# Patient Record
Sex: MALE | Race: OTHER | NOT HISPANIC OR LATINO | Employment: OTHER | URBAN - NONMETROPOLITAN AREA
[De-identification: names, ages, dates, MRNs, and addresses within clinical notes are randomized per-mention and may not be internally consistent; named-entity substitution may affect disease eponyms.]

---

## 2023-05-13 ENCOUNTER — APPOINTMENT (OUTPATIENT)
Dept: CT IMAGING | Facility: HOSPITAL | Age: 68
End: 2023-05-13
Payer: COMMERCIAL

## 2023-05-13 ENCOUNTER — HOSPITAL ENCOUNTER (EMERGENCY)
Facility: HOSPITAL | Age: 68
Discharge: HOME | End: 2023-05-13
Attending: EMERGENCY MEDICINE
Payer: COMMERCIAL

## 2023-05-13 VITALS
TEMPERATURE: 98.2 F | WEIGHT: 315 LBS | HEART RATE: 76 BPM | HEIGHT: 73 IN | BODY MASS INDEX: 41.75 KG/M2 | SYSTOLIC BLOOD PRESSURE: 137 MMHG | DIASTOLIC BLOOD PRESSURE: 77 MMHG | RESPIRATION RATE: 18 BRPM | OXYGEN SATURATION: 97 %

## 2023-05-13 DIAGNOSIS — S13.9XXA NECK SPRAIN, INITIAL ENCOUNTER: ICD-10-CM
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
## 2023-05-13 LAB
ALBUMIN SERPL-MCNC: 3.9 G/DL (ref 3.5–5.2)
ALBUMIN/GLOB SERPL: 1.4 G/DL
ALP SERPL-CCNC: 48 U/L (ref 39–117)
ALT SERPL W P-5'-P-CCNC: 33 U/L (ref 1–41)
ANION GAP SERPL CALCULATED.3IONS-SCNC: 12.8 MMOL/L (ref 5–15)
AST SERPL-CCNC: 34 U/L (ref 1–40)
BASOPHILS # BLD AUTO: 0.09 10*3/MM3 (ref 0–0.2)
BASOPHILS NFR BLD AUTO: 0.8 % (ref 0–1.5)
BILIRUB SERPL-MCNC: 0.4 MG/DL (ref 0–1.2)
BUN SERPL-MCNC: 19 MG/DL (ref 8–23)
BUN/CREAT SERPL: 19.4 (ref 7–25)
CALCIUM SPEC-SCNC: 9.9 MG/DL (ref 8.6–10.5)
CHLORIDE SERPL-SCNC: 99 MMOL/L (ref 98–107)
CO2 SERPL-SCNC: 22.2 MMOL/L (ref 22–29)
CREAT SERPL-MCNC: 0.98 MG/DL (ref 0.76–1.27)
DEPRECATED RDW RBC AUTO: 44 FL (ref 37–54)
EGFRCR SERPLBLD CKD-EPI 2021: 84.5 ML/MIN/1.73
EOSINOPHIL # BLD AUTO: 0.15 10*3/MM3 (ref 0–0.4)
EOSINOPHIL NFR BLD AUTO: 1.4 % (ref 0.3–6.2)
ERYTHROCYTE [DISTWIDTH] IN BLOOD BY AUTOMATED COUNT: 13.3 % (ref 12.3–15.4)
GLOBULIN UR ELPH-MCNC: 2.8 GM/DL
GLUCOSE SERPL-MCNC: 128 MG/DL (ref 65–99)
HCT VFR BLD AUTO: 41.4 % (ref 37.5–51)
HGB BLD-MCNC: 14.2 G/DL (ref 13–17.7)
IMM GRANULOCYTES # BLD AUTO: 0.09 10*3/MM3 (ref 0–0.05)
IMM GRANULOCYTES NFR BLD AUTO: 0.8 % (ref 0–0.5)
LYMPHOCYTES # BLD AUTO: 2.22 10*3/MM3 (ref 0.7–3.1)
LYMPHOCYTES NFR BLD AUTO: 20 % (ref 19.6–45.3)
MCH RBC QN AUTO: 31 PG (ref 26.6–33)
MCHC RBC AUTO-ENTMCNC: 34.3 G/DL (ref 31.5–35.7)
MCV RBC AUTO: 90.4 FL (ref 79–97)
MONOCYTES # BLD AUTO: 1.24 10*3/MM3 (ref 0.1–0.9)
MONOCYTES NFR BLD AUTO: 11.2 % (ref 5–12)
NEUTROPHILS NFR BLD AUTO: 65.8 % (ref 42.7–76)
NEUTROPHILS NFR BLD AUTO: 7.29 10*3/MM3 (ref 1.7–7)
NRBC BLD AUTO-RTO: 0 /100 WBC (ref 0–0.2)
PLATELET # BLD AUTO: 166 10*3/MM3 (ref 140–450)
PMV BLD AUTO: 10.5 FL (ref 6–12)
POTASSIUM SERPL-SCNC: 4.3 MMOL/L (ref 3.5–5.2)
PROT SERPL-MCNC: 6.7 G/DL (ref 6–8.5)
RBC # BLD AUTO: 4.58 10*6/MM3 (ref 4.14–5.8)
SODIUM SERPL-SCNC: 134 MMOL/L (ref 136–145)
WBC NRBC COR # BLD: 11.08 10*3/MM3 (ref 3.4–10.8)

## 2023-05-13 PROCEDURE — 72125 CT NECK SPINE W/O DYE: CPT

## 2023-05-13 PROCEDURE — 96360 HYDRATION IV INFUSION INIT: CPT

## 2023-05-13 PROCEDURE — 80053 COMPREHEN METABOLIC PANEL: CPT | Performed by: PHYSICIAN ASSISTANT

## 2023-05-13 PROCEDURE — 99283 EMERGENCY DEPT VISIT LOW MDM: CPT

## 2023-05-13 PROCEDURE — 70450 CT HEAD/BRAIN W/O DYE: CPT

## 2023-05-13 PROCEDURE — 85025 COMPLETE CBC W/AUTO DIFF WBC: CPT | Performed by: PHYSICIAN ASSISTANT

## 2023-05-13 RX ORDER — SIMVASTATIN 20 MG
20 TABLET ORAL NIGHTLY
COMMUNITY

## 2023-05-13 RX ORDER — ALLOPURINOL 100 MG/1
100 TABLET ORAL DAILY
COMMUNITY

## 2023-05-13 RX ORDER — CYCLOBENZAPRINE HCL 10 MG
10 TABLET ORAL 3 TIMES DAILY PRN
Qty: 12 TABLET | Refills: 0 | Status: SHIPPED | OUTPATIENT
Start: 2023-05-13

## 2023-05-13 RX ORDER — ESCITALOPRAM OXALATE 10 MG/1
10 TABLET ORAL DAILY
COMMUNITY

## 2023-05-13 RX ORDER — LISINOPRIL 40 MG/1
40 TABLET ORAL DAILY
COMMUNITY

## 2023-05-13 RX ADMIN — SODIUM CHLORIDE 1000 ML: 9 INJECTION, SOLUTION INTRAVENOUS at 14:02

## 2023-05-13 NOTE — ED PROVIDER NOTES
"Subjective  History of Present Illness:    Chief Complaint: Fall, head and neck injury  History of Present Illness: 68-year-old male presents after a fall at home, he fell 2 nights ago, he states that he got up, felt nauseous, felt like he was going to vomit, and passed out hitting the ground, he hit the left side of scalp, once he hit his head on the ground, he came back to, he states he has had this before when he is felt like he was going to vomit and this passed out, he initially did not want to be seen in the emergency department but after 2 days he is having a headache and neck pain especially with turning his neck.  Onset: Sudden onset  Duration: 2 days ago  Exacerbating / Alleviating factors: Fall after vomiting  Associated symptoms: Headache neck pain        Nurses Notes reviewed and agree, including vitals, allergies, social history and prior medical history.     REVIEW OF SYSTEMS: All systems reviewed and not pertinent unless noted.    Review of Systems   Musculoskeletal: Positive for neck pain.   Neurological: Positive for headaches.   All other systems reviewed and are negative.      Past Medical History:   Diagnosis Date   • Diabetes    • Gout        Allergies:    Patient has no known allergies.      History reviewed. No pertinent surgical history.      Social History     Socioeconomic History   • Marital status:          History reviewed. No pertinent family history.    Objective  Physical Exam:  /77 (BP Location: Left arm)   Pulse 76   Temp 98.2 °F (36.8 °C) (Oral)   Resp 18   Ht 185.4 cm (73\")   Wt (!) 145 kg (320 lb)   SpO2 97%   BMI 42.22 kg/m²      Physical Exam  Vitals and nursing note reviewed.   Constitutional:       Appearance: He is well-developed.   HENT:      Head: Normocephalic and atraumatic.      Mouth/Throat:      Mouth: Mucous membranes are moist.   Eyes:      Extraocular Movements: Extraocular movements intact.   Cardiovascular:      Rate and Rhythm: Normal rate " and regular rhythm.   Pulmonary:      Effort: Pulmonary effort is normal.      Breath sounds: Normal breath sounds.   Abdominal:      Palpations: Abdomen is soft.   Musculoskeletal:         General: Normal range of motion.      Cervical back: Normal range of motion.   Skin:     General: Skin is warm and dry.   Neurological:      Mental Status: He is alert and oriented to person, place, and time.   Psychiatric:         Behavior: Behavior normal.         Thought Content: Thought content normal.         Judgment: Judgment normal.           Procedures    ED Course:         Lab Results (last 24 hours)     Procedure Component Value Units Date/Time    CBC Auto Differential [[id number]]  (Abnormal) Collected: 05/13/23 1402    Specimen: Blood Updated: 05/13/23 1409     WBC 11.08 10*3/mm3      RBC 4.58 10*6/mm3      Hemoglobin 14.2 g/dL      Hematocrit 41.4 %      MCV 90.4 fL      MCH 31.0 pg      MCHC 34.3 g/dL      RDW 13.3 %      RDW-SD 44.0 fl      MPV 10.5 fL      Platelets 166 10*3/mm3      Neutrophil % 65.8 %      Lymphocyte % 20.0 %      Monocyte % 11.2 %      Eosinophil % 1.4 %      Basophil % 0.8 %      Immature Grans % 0.8 %      Neutrophils, Absolute 7.29 10*3/mm3      Lymphocytes, Absolute 2.22 10*3/mm3      Monocytes, Absolute 1.24 10*3/mm3      Eosinophils, Absolute 0.15 10*3/mm3      Basophils, Absolute 0.09 10*3/mm3      Immature Grans, Absolute 0.09 10*3/mm3      nRBC 0.0 /100 WBC     Comprehensive Metabolic Panel [[id number]]  (Abnormal) Collected: 05/13/23 1402    Specimen: Blood Updated: 05/13/23 1431     Glucose 128 mg/dL      BUN 19 mg/dL      Creatinine 0.98 mg/dL      Sodium 134 mmol/L      Potassium 4.3 mmol/L      Comment: Slight hemolysis detected by analyzer. Results may be affected.        Chloride 99 mmol/L      CO2 22.2 mmol/L      Calcium 9.9 mg/dL      Total Protein 6.7 g/dL      Albumin 3.9 g/dL      ALT (SGPT) 33 U/L      AST (SGOT) 34 U/L      Alkaline Phosphatase 48 U/L      Total Bilirubin  0.4 mg/dL      Globulin 2.8 gm/dL      A/G Ratio 1.4 g/dL      BUN/Creatinine Ratio 19.4     Anion Gap 12.8 mmol/L      eGFR 84.5 mL/min/1.73     Narrative:      GFR Normal >60  Chronic Kidney Disease <60  Kidney Failure <15             CT Head Without Contrast    Result Date: 5/13/2023  PROCEDURE: CT HEAD WO CONTRAST-  HISTORY: fall  COMPARISON:  None .  TECHNIQUE: Multiple axial CT images were performed from the foramen magnum to the vertex without enhancement.  FINDINGS: There is no CT evidence of hemorrhage. There is no mass, mass effect or midline shift.  Cerebral atrophy without evidence of hydrocephalus.. Mucosal thickening in the paranasal sinuses. Bone windows reveal no acute osseous abnormalities.      Impression: No acute hemorhage, mass effect, or midline shift..    1667.95 mGy.cm   This study was performed with techniques to keep radiation doses as low as reasonably achievable (ALARA). Individualized dose reduction techniques using automated exposure control or adjustment of mA and/or kV according to the patient size were employed.  This report was signed and finalized on 5/13/2023 3:06 PM by Poncho Alfaro DO.    CT Cervical Spine Without Contrast    Result Date: 5/13/2023  PROCEDURE: CT CERVICAL SPINE WO CONTRAST-  HISTORY: fall  COMPARISON: None .  PROCEDURE: Multiple axial CT images were obtained through the cervical spine using bone window algorithms. Coronal and sagittal images were reconstructed from the original axial data set.  FINDINGS: No acute fracture. There is reversal the normal cervical lordosis with multilevel degenerative changes, most pronounced at C5-6 and C6-7. Mild to moderate spondylosis noted. Atherosclerosis is present. Visualized lung apices are clear.      Impression: No acute fracture.       This study was performed with techniques to keep radiation doses as low as reasonably achievable (ALARA). Individualized dose reduction techniques using automated exposure control or  adjustment of mA and/or kV according to the patient size were employed.  This report was signed and finalized on 5/13/2023 3:05 PM by Poncho Alfaro DO.         Medical Decision Making  68-year-old woman presents with head and neck pain, after a fall, differential would include contusion, concussion, cervical sprain, intracranial hemorrhage, cervical fracture.  Upon evaluation the patient had pain on the left side of his scalp, and left side of his neck, he states that he passed out after nearly vomiting.  An IV was established, fluids were given, and a CT scan of the head and neck were performed, all results were negative, interpreted all labs and discussed with the patient at bedside, and discussed results of the CT scan of the head and neck with the patient and family bedside.  On physical exam he remained stable, he will be discharged home with Flexeril, and follow-up as needed.    Fall, initial encounter: acute illness or injury  Neck sprain, initial encounter: acute illness or injury  Amount and/or Complexity of Data Reviewed  Radiology: ordered. Decision-making details documented in ED Course.      Risk  Prescription drug management.            Final diagnoses:   Fall, initial encounter   Neck sprain, initial encounter        Jer Guerra Jr., PA-C  05/13/23 1546